# Patient Record
Sex: FEMALE | Race: WHITE | NOT HISPANIC OR LATINO | Employment: OTHER | URBAN - METROPOLITAN AREA
[De-identification: names, ages, dates, MRNs, and addresses within clinical notes are randomized per-mention and may not be internally consistent; named-entity substitution may affect disease eponyms.]

---

## 2020-12-21 ENCOUNTER — HOSPITAL ENCOUNTER (OUTPATIENT)
Facility: HOSPITAL | Age: 55
Setting detail: OBSERVATION
Discharge: HOME | End: 2020-12-23
Attending: INTERNAL MEDICINE | Admitting: HOSPITALIST

## 2020-12-21 DIAGNOSIS — R10.31 RIGHT GROIN PAIN: Primary | ICD-10-CM

## 2020-12-21 PROBLEM — E66.9 OBESITY (BMI 30-39.9): Chronic | Status: ACTIVE | Noted: 2020-12-21

## 2020-12-21 PROBLEM — I25.10 CAD (CORONARY ARTERY DISEASE): Chronic | Status: ACTIVE | Noted: 2020-12-21

## 2020-12-21 LAB
ALBUMIN SERPL-MCNC: 3.6 G/DL (ref 3.5–5.2)
ALBUMIN/GLOB SERPL: 1.4 G/DL
ALP SERPL-CCNC: 100 U/L (ref 39–117)
ALT SERPL W P-5'-P-CCNC: 17 U/L (ref 1–33)
ANION GAP SERPL CALCULATED.3IONS-SCNC: 6 MMOL/L (ref 5–15)
AST SERPL-CCNC: 13 U/L (ref 1–32)
BASOPHILS # BLD AUTO: 0.1 10*3/MM3 (ref 0–0.2)
BASOPHILS NFR BLD AUTO: 0.6 % (ref 0–1.5)
BILIRUB SERPL-MCNC: 0.2 MG/DL (ref 0–1.2)
BUN SERPL-MCNC: 16 MG/DL (ref 6–20)
BUN/CREAT SERPL: 19.5 (ref 7–25)
CALCIUM SPEC-SCNC: 8.8 MG/DL (ref 8.6–10.5)
CHLORIDE SERPL-SCNC: 106 MMOL/L (ref 98–107)
CO2 SERPL-SCNC: 29 MMOL/L (ref 22–29)
CREAT SERPL-MCNC: 0.82 MG/DL (ref 0.57–1)
DEPRECATED RDW RBC AUTO: 46.4 FL (ref 37–54)
EOSINOPHIL # BLD AUTO: 0.4 10*3/MM3 (ref 0–0.4)
EOSINOPHIL NFR BLD AUTO: 3.4 % (ref 0.3–6.2)
ERYTHROCYTE [DISTWIDTH] IN BLOOD BY AUTOMATED COUNT: 14.7 % (ref 12.3–15.4)
GFR SERPL CREATININE-BSD FRML MDRD: 72 ML/MIN/1.73
GLOBULIN UR ELPH-MCNC: 2.6 GM/DL
GLUCOSE SERPL-MCNC: 89 MG/DL (ref 65–99)
HCT VFR BLD AUTO: 35.1 % (ref 34–46.6)
HGB BLD-MCNC: 11.6 G/DL (ref 12–15.9)
HOLD SPECIMEN: NORMAL
INR PPP: <0.93 (ref 0.93–1.1)
LYMPHOCYTES # BLD AUTO: 3.1 10*3/MM3 (ref 0.7–3.1)
LYMPHOCYTES NFR BLD AUTO: 26.9 % (ref 19.6–45.3)
MCH RBC QN AUTO: 29.6 PG (ref 26.6–33)
MCHC RBC AUTO-ENTMCNC: 33.1 G/DL (ref 31.5–35.7)
MCV RBC AUTO: 89.5 FL (ref 79–97)
MONOCYTES # BLD AUTO: 1 10*3/MM3 (ref 0.1–0.9)
MONOCYTES NFR BLD AUTO: 8.5 % (ref 5–12)
NEUTROPHILS NFR BLD AUTO: 60.6 % (ref 42.7–76)
NEUTROPHILS NFR BLD AUTO: 7 10*3/MM3 (ref 1.7–7)
NRBC BLD AUTO-RTO: 0 /100 WBC (ref 0–0.2)
PLATELET # BLD AUTO: 301 10*3/MM3 (ref 140–450)
PMV BLD AUTO: 7.4 FL (ref 6–12)
POTASSIUM SERPL-SCNC: 3.6 MMOL/L (ref 3.5–5.2)
POTASSIUM SERPL-SCNC: 4.2 MMOL/L (ref 3.5–5.2)
PROT SERPL-MCNC: 6.2 G/DL (ref 6–8.5)
PROTHROMBIN TIME: 10 SECONDS (ref 9.6–11.7)
RBC # BLD AUTO: 3.92 10*6/MM3 (ref 3.77–5.28)
SODIUM SERPL-SCNC: 141 MMOL/L (ref 136–145)
WBC # BLD AUTO: 11.5 10*3/MM3 (ref 3.4–10.8)

## 2020-12-21 PROCEDURE — G0378 HOSPITAL OBSERVATION PER HR: HCPCS

## 2020-12-21 PROCEDURE — 85025 COMPLETE CBC W/AUTO DIFF WBC: CPT | Performed by: NURSE PRACTITIONER

## 2020-12-21 PROCEDURE — 84132 ASSAY OF SERUM POTASSIUM: CPT | Performed by: PHYSICIAN ASSISTANT

## 2020-12-21 PROCEDURE — 85610 PROTHROMBIN TIME: CPT | Performed by: NURSE PRACTITIONER

## 2020-12-21 PROCEDURE — 99219 PR INITIAL OBSERVATION CARE/DAY 50 MINUTES: CPT | Performed by: PHYSICIAN ASSISTANT

## 2020-12-21 PROCEDURE — 80053 COMPREHEN METABOLIC PANEL: CPT | Performed by: NURSE PRACTITIONER

## 2020-12-21 PROCEDURE — 99284 EMERGENCY DEPT VISIT MOD MDM: CPT

## 2020-12-21 RX ORDER — SODIUM CHLORIDE 0.9 % (FLUSH) 0.9 %
10 SYRINGE (ML) INJECTION AS NEEDED
Status: DISCONTINUED | OUTPATIENT
Start: 2020-12-21 | End: 2020-12-23 | Stop reason: HOSPADM

## 2020-12-21 RX ORDER — ONDANSETRON 4 MG/1
4 TABLET, FILM COATED ORAL EVERY 6 HOURS PRN
Status: DISCONTINUED | OUTPATIENT
Start: 2020-12-21 | End: 2020-12-23 | Stop reason: HOSPADM

## 2020-12-21 RX ORDER — CHOLECALCIFEROL (VITAMIN D3) 125 MCG
5 CAPSULE ORAL NIGHTLY PRN
Status: DISCONTINUED | OUTPATIENT
Start: 2020-12-21 | End: 2020-12-23 | Stop reason: HOSPADM

## 2020-12-21 RX ORDER — ONDANSETRON 2 MG/ML
4 INJECTION INTRAMUSCULAR; INTRAVENOUS EVERY 6 HOURS PRN
Status: DISCONTINUED | OUTPATIENT
Start: 2020-12-21 | End: 2020-12-23 | Stop reason: HOSPADM

## 2020-12-21 RX ORDER — CLOPIDOGREL BISULFATE 75 MG/1
75 TABLET ORAL DAILY
COMMUNITY
Start: 2020-12-18

## 2020-12-21 RX ORDER — CLOPIDOGREL BISULFATE 75 MG/1
75 TABLET ORAL DAILY
Status: DISCONTINUED | OUTPATIENT
Start: 2020-12-22 | End: 2020-12-23 | Stop reason: HOSPADM

## 2020-12-21 RX ORDER — POTASSIUM CHLORIDE 1.5 G/1.77G
40 POWDER, FOR SOLUTION ORAL AS NEEDED
Status: DISCONTINUED | OUTPATIENT
Start: 2020-12-21 | End: 2020-12-23 | Stop reason: HOSPADM

## 2020-12-21 RX ORDER — CALCIUM GLUCONATE 20 MG/ML
2 INJECTION, SOLUTION INTRAVENOUS AS NEEDED
Status: DISCONTINUED | OUTPATIENT
Start: 2020-12-21 | End: 2020-12-23 | Stop reason: HOSPADM

## 2020-12-21 RX ORDER — METOPROLOL SUCCINATE 25 MG/1
25 TABLET, EXTENDED RELEASE ORAL
Status: DISCONTINUED | OUTPATIENT
Start: 2020-12-22 | End: 2020-12-23 | Stop reason: HOSPADM

## 2020-12-21 RX ORDER — MAGNESIUM SULFATE HEPTAHYDRATE 40 MG/ML
4 INJECTION, SOLUTION INTRAVENOUS AS NEEDED
Status: DISCONTINUED | OUTPATIENT
Start: 2020-12-21 | End: 2020-12-23 | Stop reason: HOSPADM

## 2020-12-21 RX ORDER — ACETAMINOPHEN 325 MG/1
650 TABLET ORAL EVERY 4 HOURS PRN
Status: DISCONTINUED | OUTPATIENT
Start: 2020-12-21 | End: 2020-12-23 | Stop reason: HOSPADM

## 2020-12-21 RX ORDER — POTASSIUM CHLORIDE 20 MEQ/1
40 TABLET, EXTENDED RELEASE ORAL AS NEEDED
Status: DISCONTINUED | OUTPATIENT
Start: 2020-12-21 | End: 2020-12-23 | Stop reason: HOSPADM

## 2020-12-21 RX ORDER — LISINOPRIL 5 MG/1
5 TABLET ORAL DAILY
COMMUNITY
Start: 2020-12-18

## 2020-12-21 RX ORDER — ACETAMINOPHEN 650 MG/1
650 SUPPOSITORY RECTAL EVERY 4 HOURS PRN
Status: DISCONTINUED | OUTPATIENT
Start: 2020-12-21 | End: 2020-12-23 | Stop reason: HOSPADM

## 2020-12-21 RX ORDER — LISINOPRIL 5 MG/1
5 TABLET ORAL DAILY
Status: DISCONTINUED | OUTPATIENT
Start: 2020-12-22 | End: 2020-12-23 | Stop reason: HOSPADM

## 2020-12-21 RX ORDER — SODIUM CHLORIDE 0.9 % (FLUSH) 0.9 %
10 SYRINGE (ML) INJECTION EVERY 12 HOURS SCHEDULED
Status: DISCONTINUED | OUTPATIENT
Start: 2020-12-21 | End: 2020-12-23 | Stop reason: HOSPADM

## 2020-12-21 RX ORDER — ACETAMINOPHEN 160 MG/5ML
650 SOLUTION ORAL EVERY 4 HOURS PRN
Status: DISCONTINUED | OUTPATIENT
Start: 2020-12-21 | End: 2020-12-23 | Stop reason: HOSPADM

## 2020-12-21 RX ORDER — ATORVASTATIN CALCIUM 40 MG/1
80 TABLET, FILM COATED ORAL NIGHTLY
Status: DISCONTINUED | OUTPATIENT
Start: 2020-12-21 | End: 2020-12-23 | Stop reason: HOSPADM

## 2020-12-21 RX ORDER — MAGNESIUM SULFATE HEPTAHYDRATE 40 MG/ML
2 INJECTION, SOLUTION INTRAVENOUS AS NEEDED
Status: DISCONTINUED | OUTPATIENT
Start: 2020-12-21 | End: 2020-12-23 | Stop reason: HOSPADM

## 2020-12-21 RX ORDER — ATORVASTATIN CALCIUM 80 MG/1
80 TABLET, FILM COATED ORAL NIGHTLY
COMMUNITY
Start: 2020-12-18

## 2020-12-21 RX ORDER — ASPIRIN 81 MG/1
81 TABLET, CHEWABLE ORAL DAILY
COMMUNITY
Start: 2020-12-18

## 2020-12-21 RX ORDER — NITROGLYCERIN 0.4 MG/1
0.4 TABLET SUBLINGUAL
Status: DISCONTINUED | OUTPATIENT
Start: 2020-12-21 | End: 2020-12-23 | Stop reason: HOSPADM

## 2020-12-21 RX ORDER — CALCIUM GLUCONATE 20 MG/ML
1 INJECTION, SOLUTION INTRAVENOUS AS NEEDED
Status: DISCONTINUED | OUTPATIENT
Start: 2020-12-21 | End: 2020-12-23 | Stop reason: HOSPADM

## 2020-12-21 RX ORDER — ASPIRIN 81 MG/1
81 TABLET, CHEWABLE ORAL DAILY
Status: DISCONTINUED | OUTPATIENT
Start: 2020-12-22 | End: 2020-12-23 | Stop reason: HOSPADM

## 2020-12-21 RX ADMIN — Medication 10 ML: at 23:03

## 2020-12-22 ENCOUNTER — APPOINTMENT (OUTPATIENT)
Dept: CARDIOLOGY | Facility: HOSPITAL | Age: 55
End: 2020-12-22

## 2020-12-22 ENCOUNTER — APPOINTMENT (OUTPATIENT)
Dept: CT IMAGING | Facility: HOSPITAL | Age: 55
End: 2020-12-22

## 2020-12-22 PROBLEM — I10 ESSENTIAL HYPERTENSION: Status: ACTIVE | Noted: 2020-12-22

## 2020-12-22 PROBLEM — Z72.0 TOBACCO ABUSE: Status: ACTIVE | Noted: 2020-12-22

## 2020-12-22 LAB
ANION GAP SERPL CALCULATED.3IONS-SCNC: 5 MMOL/L (ref 5–15)
BASOPHILS # BLD AUTO: 0.1 10*3/MM3 (ref 0–0.2)
BASOPHILS NFR BLD AUTO: 1 % (ref 0–1.5)
BH CV RIGHT GROIN PSA PROCEDURE SCRIPTING LRR: 1
BH CV VAS R PFA VELOCITY EDV: 7.86 CM/SEC
BH CV VAS R SFA VELOCITY EDV: 14 CM/SEC
BUN SERPL-MCNC: 19 MG/DL (ref 6–20)
BUN/CREAT SERPL: 19 (ref 7–25)
CALCIUM SPEC-SCNC: 8.7 MG/DL (ref 8.6–10.5)
CHLORIDE SERPL-SCNC: 107 MMOL/L (ref 98–107)
CO2 SERPL-SCNC: 30 MMOL/L (ref 22–29)
CREAT SERPL-MCNC: 1 MG/DL (ref 0.57–1)
DEPRECATED RDW RBC AUTO: 45.1 FL (ref 37–54)
EOSINOPHIL # BLD AUTO: 0.4 10*3/MM3 (ref 0–0.4)
EOSINOPHIL NFR BLD AUTO: 3.3 % (ref 0.3–6.2)
ERYTHROCYTE [DISTWIDTH] IN BLOOD BY AUTOMATED COUNT: 14.3 % (ref 12.3–15.4)
GFR SERPL CREATININE-BSD FRML MDRD: 57 ML/MIN/1.73
GLUCOSE BLDC GLUCOMTR-MCNC: 109 MG/DL (ref 70–105)
GLUCOSE SERPL-MCNC: 97 MG/DL (ref 65–99)
HCT VFR BLD AUTO: 32.5 % (ref 34–46.6)
HGB BLD-MCNC: 10.8 G/DL (ref 12–15.9)
LYMPHOCYTES # BLD AUTO: 3.7 10*3/MM3 (ref 0.7–3.1)
LYMPHOCYTES NFR BLD AUTO: 34.7 % (ref 19.6–45.3)
MAGNESIUM SERPL-MCNC: 2.1 MG/DL (ref 1.6–2.6)
MCH RBC QN AUTO: 29.8 PG (ref 26.6–33)
MCHC RBC AUTO-ENTMCNC: 33.1 G/DL (ref 31.5–35.7)
MCV RBC AUTO: 90 FL (ref 79–97)
MONOCYTES # BLD AUTO: 0.9 10*3/MM3 (ref 0.1–0.9)
MONOCYTES NFR BLD AUTO: 8.3 % (ref 5–12)
NEUTROPHILS NFR BLD AUTO: 5.5 10*3/MM3 (ref 1.7–7)
NEUTROPHILS NFR BLD AUTO: 52.7 % (ref 42.7–76)
NRBC BLD AUTO-RTO: 0.1 /100 WBC (ref 0–0.2)
PLATELET # BLD AUTO: 274 10*3/MM3 (ref 140–450)
PMV BLD AUTO: 7.3 FL (ref 6–12)
POTASSIUM SERPL-SCNC: 4.4 MMOL/L (ref 3.5–5.2)
PROX PFA PSV RIGHT: 39.8 CM/SEC
PROX SFA PSV RIGHT: 113 CM/SEC
RBC # BLD AUTO: 3.61 10*6/MM3 (ref 3.77–5.28)
RIGHT GROIN CFA SYS: 123 CM/SEC
SODIUM SERPL-SCNC: 142 MMOL/L (ref 136–145)
WBC # BLD AUTO: 10.5 10*3/MM3 (ref 3.4–10.8)

## 2020-12-22 PROCEDURE — 83735 ASSAY OF MAGNESIUM: CPT | Performed by: PHYSICIAN ASSISTANT

## 2020-12-22 PROCEDURE — 85025 COMPLETE CBC W/AUTO DIFF WBC: CPT | Performed by: PHYSICIAN ASSISTANT

## 2020-12-22 PROCEDURE — 80048 BASIC METABOLIC PNL TOTAL CA: CPT | Performed by: PHYSICIAN ASSISTANT

## 2020-12-22 PROCEDURE — 93926 LOWER EXTREMITY STUDY: CPT

## 2020-12-22 PROCEDURE — 0 IOPAMIDOL PER 1 ML: Performed by: HOSPITALIST

## 2020-12-22 PROCEDURE — G0378 HOSPITAL OBSERVATION PER HR: HCPCS

## 2020-12-22 PROCEDURE — 74177 CT ABD & PELVIS W/CONTRAST: CPT

## 2020-12-22 PROCEDURE — 99225 PR SBSQ OBSERVATION CARE/DAY 25 MINUTES: CPT | Performed by: HOSPITALIST

## 2020-12-22 PROCEDURE — 82962 GLUCOSE BLOOD TEST: CPT

## 2020-12-22 RX ORDER — HYDROCODONE BITARTRATE AND ACETAMINOPHEN 5; 325 MG/1; MG/1
1 TABLET ORAL ONCE
Status: COMPLETED | OUTPATIENT
Start: 2020-12-22 | End: 2020-12-22

## 2020-12-22 RX ORDER — HYDROCODONE BITARTRATE AND ACETAMINOPHEN 5; 325 MG/1; MG/1
1 TABLET ORAL EVERY 6 HOURS PRN
Status: DISCONTINUED | OUTPATIENT
Start: 2020-12-22 | End: 2020-12-22

## 2020-12-22 RX ORDER — NICOTINE 21 MG/24HR
1 PATCH, TRANSDERMAL 24 HOURS TRANSDERMAL EVERY 24 HOURS
Status: DISCONTINUED | OUTPATIENT
Start: 2020-12-22 | End: 2020-12-23 | Stop reason: HOSPADM

## 2020-12-22 RX ADMIN — ASPIRIN 81 MG CHEWABLE TABLET 81 MG: 81 TABLET CHEWABLE at 09:08

## 2020-12-22 RX ADMIN — CLOPIDOGREL BISULFATE 75 MG: 75 TABLET ORAL at 09:09

## 2020-12-22 RX ADMIN — IOPAMIDOL 100 ML: 755 INJECTION, SOLUTION INTRAVENOUS at 13:00

## 2020-12-22 RX ADMIN — Medication 1 PATCH: at 07:13

## 2020-12-22 RX ADMIN — Medication 10 ML: at 21:56

## 2020-12-22 RX ADMIN — ATORVASTATIN CALCIUM 80 MG: 40 TABLET, FILM COATED ORAL at 21:56

## 2020-12-22 RX ADMIN — Medication 10 ML: at 09:10

## 2020-12-22 RX ADMIN — HYDROCODONE BITARTRATE AND ACETAMINOPHEN 1 TABLET: 5; 325 TABLET ORAL at 00:41

## 2020-12-22 RX ADMIN — ATORVASTATIN CALCIUM 80 MG: 40 TABLET, FILM COATED ORAL at 00:41

## 2020-12-22 NOTE — H&P
[facility] Medicine Services      Patient Name: Kimberly West  : 1965  MRN: [id number]  Primary Care Physician: Provider, No Known  Date of admission: 2020    Patient Care Team:  Provider, No Known as PCP - General          Subjective   History Present Illness     Chief Complaint:   Chief Complaint   Patient presents with   • Leg Pain         Ms. West is a 55 y.o. female with past medical history of tobacco abuse, obesity, hypertension CAD status post recent stenting who presents to [facility] complaining of pain in right groin.  Patient reports that soon after cardiac cath on 2020 [facility] she noted significant and expanding bruising on the medial portion of her right thigh and groin with a small nodule noted as slowly increasing in size.  Patient reports pain in the area described as a sharp squeezing pain rated at 10/10 made worse if she is immobile for long periods of time better with movement.  Pain is noted as radiating down her right leg.  Occasional palpitations are also reported she notes a mild chronic nonproductive cough but denies any dyspnea, nausea or vomiting, changes in bowel or bladder habits, fever, chills, peripheral edema, syncope or near syncope.  Patient does report that she continues to smoke proximately 1/2 pack cigarettes per day    In the ED patient did have lab significant for WBCs: 11.50 with increased absolute monocyte count noted on differential, hemoglobin: 11.6, INR: 0.93.    History of Present Illness    Review of Systems   Constitution: Negative.   HENT: Negative.    Eyes: Negative.    Cardiovascular: Positive for palpitations. Negative for chest pain, irregular heartbeat, leg swelling, near-syncope and syncope.   Respiratory: Positive for cough. Negative for shortness of breath and sputum production.         Chronic cough at baseline   Endocrine: Negative.    Skin: Negative.    Musculoskeletal:        Right leg  pain   Gastrointestinal: Negative.    Neurological: Negative.    Psychiatric/Behavioral: Negative.            Personal History     Past Medical History: History reviewed. No pertinent past medical history.    Surgical History:    History reviewed. No pertinent surgical history.        Family History: family history is not on file. Otherwise pertinent FHx was reviewed and unremarkable.     Social History:        Medications:  Prior to Admission medications    Not on File       Allergies:  No Known Allergies    Objective   Objective     Vital Signs  Temp:  [98.5 °F (36.9 °C)] 98.5 °F (36.9 °C)  Heart Rate:  [] 97  Resp:  [18] 18  BP: ()/() 90/62  SpO2:  [97 %-99 %] 99 %  on   ;   Device (Oxygen Therapy): room air  Body mass index is 30.23 kg/m².    Physical Exam  Vitals signs reviewed. Exam conducted with a chaperone present.   Constitutional:       General: She is not in acute distress.     Appearance: Normal appearance. She is obese. She is not ill-appearing or toxic-appearing.   HENT:      Head: Normocephalic and atraumatic.      Right Ear: External ear normal.      Left Ear: External ear normal.      Nose: Nose normal.      Mouth/Throat:      Mouth: Mucous membranes are moist.   Eyes:      Extraocular Movements: Extraocular movements intact.      Conjunctiva/sclera: Conjunctivae normal.   Neck:      Musculoskeletal: Normal range of motion.   Cardiovascular:      Rate and Rhythm: Normal rate and regular rhythm.      Pulses: Normal pulses.      Heart sounds: Normal heart sounds.   Pulmonary:      Effort: Pulmonary effort is normal.      Breath sounds: Normal breath sounds.   Abdominal:      General: Bowel sounds are normal. There is no distension.      Tenderness: There is no abdominal tenderness.   Musculoskeletal: Normal range of motion.   Skin:     General: Skin is warm and dry.      Findings: Bruising present.      Comments: Bruising along right thigh and groin with small nodule noted in right  groin   Neurological:      General: No focal deficit present.      Mental Status: She is alert and oriented to person, place, and time.   Psychiatric:         Mood and Affect: Mood normal.         Behavior: Behavior normal.         Thought Content: Thought content normal.         Judgment: Judgment normal.         Results Review:  I have personally reviewed most recent lab results and agree with findings, most notably: CBC, CMP, PT/INR.    Results from last 7 days   Lab Units 12/21/20 2019   WBC 10*3/mm3 11.50*   HEMOGLOBIN g/dL 11.6*   HEMATOCRIT % 35.1   PLATELETS 10*3/mm3 301   INR  <0.93*     Results from last 7 days   Lab Units 12/21/20 2019   SODIUM mmol/L 141   POTASSIUM mmol/L 4.2   CHLORIDE mmol/L 106   CO2 mmol/L 29.0   BUN mg/dL 16   CREATININE mg/dL 0.82   GLUCOSE mg/dL 89   CALCIUM mg/dL 8.8   ALT (SGPT) U/L 17   AST (SGOT) U/L 13     Estimated Creatinine Clearance: 68.2 mL/min (by C-G formula based on SCr of 0.82 mg/dL).  Brief Urine Lab Results     None          Microbiology Results (last 10 days)     ** No results found for the last 240 hours. **          ECG/EMG Results (most recent)     None                    No radiology results for the last 7 days      Estimated Creatinine Clearance: 68.2 mL/min (by C-G formula based on SCr of 0.82 mg/dL).    Assessment/Plan   Assessment/Plan       Active Hospital Problems    Diagnosis  POA   • **Right groin pain [R10.31]  Yes     Priority: High   • CAD (coronary artery disease) [I25.10]  Yes     Priority: Medium   • Obesity (BMI 30-39.9) [E66.9]  Unknown     Priority: Low      Resolved Hospital Problems   No resolved problems to display.     Right groin pain  -Patient reports worsening bruising along right thigh and groin with a small nodule which is increasing in size since cardiac catheterization on 12/17/2020 at [facility].  Nodule approximately 1/2 inch in diameter with slight pulsation noted  -Maintain bedrest  -Ultrasound  ordered  -Norco ordered x1 for pain  -Vascular surgery consulted    CAD  -Aspirin, statin, Plavix and beta-blocker  -Continue cardiac monitoring    Hypertension  -Well controlled with a blood pressure on admission of 114/74  - Continue metoprolol and lisinopril  - Monitor while admitted    Bacot abuse  -Patient reports she continues to smoke proximately 1/2 pack cigarettes per day  -Encourage cessation especially in light of patient's comorbid conditions  -Nicotine patch ordered    Obesity (BMI: 30.23)  -Encourage diet lifestyle modifications            VTE Prophylaxis -SCDs  Mechanical Order History:     None      Pharmalogical Order History:     None          CODE STATUS: Full  There are no questions and answers to display.           I discussed the patient's findings and my recommendations with patient and nursing staff.      Signature:Electronically signed by Raj Parker PA-C, 12/22/20, 6:08 AM EST.    [facility] Hospitalist Team

## 2020-12-22 NOTE — PROGRESS NOTES
[facility] Medicine Services Daily Progress Note      Hospitalist Team  LOS 0 days      Patient Care Team:  Provider, No Known as PCP - General    Patient Location: [facility]/1      Subjective   Subjective     Chief Complaint / Subjective  Chief Complaint   Patient presents with   • Leg Pain         Brief Synopsis of Hospital Course/HPI    Ms. West is a 55 y.o. female with past medical history of tobacco abuse, obesity, hypertension CAD status post recent stenting who presents to [facility] complaining of pain in right groin.  Patient reports that soon after cardiac cath on 12/17/2020 [facility] she noted significant and expanding bruising on the medial portion of her right thigh and groin with a small nodule noted as slowly increasing in size.  Patient reports pain in the area described as a sharp squeezing pain rated at 10/10 made worse if she is immobile for long periods of time better with movement.  Pain is noted as radiating down her right leg.  Occasional palpitations are also reported she notes a mild chronic nonproductive cough but denies any dyspnea, nausea or vomiting, changes in bowel or bladder habits, fever, chills, peripheral edema, syncope or near syncope.  Patient does report that she continues to smoke proximately 1/2 pack cigarettes per day     In the ED patient did have lab significant for WBCs: 11.50 with increased absolute monocyte count noted on differential, hemoglobin: 11.6, INR: 0.93.      Date::      12/22/20: afebrile.  Complains of groin pain.  Ordered CT of the abdomen/pelvis.  Vascular surgery consulted.    Review of Systems   All other systems reviewed and are negative.        Objective   Objective      Vital Signs  Temp:  [97.6 °F (36.4 °C)-98.5 °F (36.9 °C)] 97.6 °F (36.4 °C)  Heart Rate:  [] 95  Resp:  [16-19] 19  BP: ()/() 115/74  Oxygen Therapy  SpO2: 98 %  Pulse Oximetry Type: Intermittent  Device (Oxygen Therapy): room  "air  Flowsheet Rows      First Filed Value   Admission Height  149.9 cm (59\") Documented at 12/21/2020 1944   Admission Weight  67.9 kg (149 lb 11.1 oz) Documented at 12/21/2020 1944        Intake & Output (last 3 days)       12/19 0701 - 12/20 0700 12/20 0701 - 12/21 0700 12/21 0701 - 12/22 0700 12/22 0701 - 12/23 0700    P.O.    236    Total Intake(mL/kg)    236 (3.5)    Net    +236                Lines, Drains & Airways    Active LDAs     Name:   Placement date:   Placement time:   Site:   Days:    Peripheral IV 12/21/20 2017 Left Antecubital   12/21/20 2017    Antecubital   less than 1                  Physical Exam:    Physical Exam  HENT:      Head: Normocephalic and atraumatic.      Mouth/Throat:      Mouth: Mucous membranes are moist.   Eyes:      General: No scleral icterus.     Extraocular Movements: Extraocular movements intact.      Pupils: Pupils are equal, round, and reactive to light.   Neck:      Musculoskeletal: Normal range of motion.   Cardiovascular:      Rate and Rhythm: Normal rate and regular rhythm.   Pulmonary:      Effort: Pulmonary effort is normal.      Breath sounds: Normal breath sounds.   Abdominal:      General: Bowel sounds are normal.      Palpations: Abdomen is soft.   Musculoskeletal: Normal range of motion.   Lymphadenopathy:      Cervical: No cervical adenopathy.   Skin:     General: Skin is warm.   Neurological:      Mental Status: She is alert and oriented to person, place, and time.   Psychiatric:         Mood and Affect: Mood is anxious.               Procedures:              Results Review:     I reviewed the patient's new clinical results.      Lab Results (last 24 hours)     Procedure Component Value Units Date/Time    POC Glucose Once [[id number]]  (Abnormal) Collected: 12/22/20 1027    Specimen: Blood Updated: 12/22/20 1028     Glucose 109 mg/dL      Comment: Serial Number: [id number]:  [id number]       Basic Metabolic Panel [[id number]]  (Abnormal) Collected: " 12/22/20 0257    Specimen: Blood Updated: 12/22/20 0402     Glucose 97 mg/dL      BUN 19 mg/dL      Creatinine 1.00 mg/dL      Sodium 142 mmol/L      Potassium 4.4 mmol/L      Chloride 107 mmol/L      CO2 30.0 mmol/L      Calcium 8.7 mg/dL      eGFR Non African Amer 57 mL/min/1.73      BUN/Creatinine Ratio 19.0     Anion Gap 5.0 mmol/L     Narrative:      GFR Normal >60  Chronic Kidney Disease <60  Kidney Failure <15      Magnesium [[id number]]  (Normal) Collected: 12/22/20 0257    Specimen: Blood Updated: 12/22/20 0402     Magnesium 2.1 mg/dL     CBC & Differential [[id number]]  (Abnormal) Collected: 12/22/20 0257    Specimen: Blood Updated: 12/22/20 0318    Narrative:      The following orders were created for panel order CBC & Differential.  Procedure                               Abnormality         Status                     ---------                               -----------         ------                     CBC Auto Differential[[id number]]        Abnormal            Final result                 Please view results for these tests on the individual orders.    CBC Auto Differential [[id number]]  (Abnormal) Collected: 12/22/20 0257    Specimen: Blood Updated: 12/22/20 0318     WBC 10.50 10*3/mm3      RBC 3.61 10*6/mm3      Hemoglobin 10.8 g/dL      Hematocrit 32.5 %      MCV 90.0 fL      MCH 29.8 pg      MCHC 33.1 g/dL      RDW 14.3 %      RDW-SD 45.1 fl      MPV 7.3 fL      Platelets 274 10*3/mm3      Neutrophil % 52.7 %      Lymphocyte % 34.7 %      Monocyte % 8.3 %      Eosinophil % 3.3 %      Basophil % 1.0 %      Neutrophils, Absolute 5.50 10*3/mm3      Lymphocytes, Absolute 3.70 10*3/mm3      Monocytes, Absolute 0.90 10*3/mm3      Eosinophils, Absolute 0.40 10*3/mm3      Basophils, Absolute 0.10 10*3/mm3      nRBC 0.1 /100 WBC     Potassium [[id number]]  (Normal) Collected: 12/21/20 2239    Specimen: Blood Updated: 12/21/20 2315     Potassium 3.6 mmol/L     [place] Draw [[id number]] Collected: 12/21/20 2018     Specimen: Blood Updated: 12/21/20 2132    Narrative:      The following orders were created for panel order [place] Draw.  Procedure                               Abnormality         Status                     ---------                               -----------         ------                     Light Blue Top[[id number]]                                                              Green Top (Gel)[[id number]]                                                             Lavender Top[[id number]]                                                                Gold Top - SST[[id number]]                                   Final result                 Please view results for these tests on the individual orders.    Gold Top - SST [[id number]] Collected: 12/21/20 2018    Specimen: Blood Updated: 12/21/20 2132     Extra Tube Hold for add-ons.     Comment: Auto resulted.       Protime-INR [[id number]]  (Abnormal) Collected: 12/21/20 2019    Specimen: Blood Updated: 12/21/20 2109     Protime 10.0 Seconds      INR <0.93    Comprehensive Metabolic Panel [[id number]] Collected: 12/21/20 2019    Specimen: Blood Updated: 12/21/20 2045     Glucose 89 mg/dL      BUN 16 mg/dL      Creatinine 0.82 mg/dL      Sodium 141 mmol/L      Potassium 4.2 mmol/L      Chloride 106 mmol/L      CO2 29.0 mmol/L      Calcium 8.8 mg/dL      Total Protein 6.2 g/dL      Albumin 3.60 g/dL      ALT (SGPT) 17 U/L      AST (SGOT) 13 U/L      Alkaline Phosphatase 100 U/L      Total Bilirubin 0.2 mg/dL      eGFR Non African Amer 72 mL/min/1.73      Globulin 2.6 gm/dL      A/G Ratio 1.4 g/dL      BUN/Creatinine Ratio 19.5     Anion Gap 6.0 mmol/L     Narrative:      GFR Normal >60  Chronic Kidney Disease <60  Kidney Failure <15      CBC & Differential [[id number]]  (Abnormal) Collected: 12/21/20 2019    Specimen: Blood Updated: 12/21/20 2025    Narrative:      The following orders were created for panel order CBC & Differential.  Procedure                                Abnormality         Status                     ---------                               -----------         ------                     CBC Auto Differential[[id number]]        Abnormal            Final result                 Please view results for these tests on the individual orders.    CBC Auto Differential [[id number]]  (Abnormal) Collected: 12/21/20 2019    Specimen: Blood Updated: 12/21/20 2025     WBC 11.50 10*3/mm3      RBC 3.92 10*6/mm3      Hemoglobin 11.6 g/dL      Hematocrit 35.1 %      MCV 89.5 fL      MCH 29.6 pg      MCHC 33.1 g/dL      RDW 14.7 %      RDW-SD 46.4 fl      MPV 7.4 fL      Platelets 301 10*3/mm3      Neutrophil % 60.6 %      Lymphocyte % 26.9 %      Monocyte % 8.5 %      Eosinophil % 3.4 %      Basophil % 0.6 %      Neutrophils, Absolute 7.00 10*3/mm3      Lymphocytes, Absolute 3.10 10*3/mm3      Monocytes, Absolute 1.00 10*3/mm3      Eosinophils, Absolute 0.40 10*3/mm3      Basophils, Absolute 0.10 10*3/mm3      nRBC 0.0 /100 WBC         No results found for: HGBA1C  Results from last 7 days   Lab Units 12/21/20 2019   INR  <0.93*           No results found for: LIPASE  No results found for: CHOL, CHLPL, TRIG, HDL, LDL, LDLDIRECT    No results found for: INTRAOP, PREDX, FINALDX, COMDX    Microbiology Results (last 10 days)     ** No results found for the last 240 hours. **          ECG/EMG Results (most recent)     None          Results for orders placed during the hospital encounter of 12/21/20   Duplex Groin Pseudoaneurysm unilateral CAR    Narrative · No evidence of pseudoaneurysm or AV fistula in the right groin.               No radiology results for the last 7 days        Xrays, labs reviewed personally by physician.    Medication Review:   I have reviewed the patient's current medication list      Scheduled Meds  aspirin, 81 mg, Oral, Daily  atorvastatin, 80 mg, Oral, Nightly  clopidogrel, 75 mg, Oral, Daily  lisinopril, 5 mg, Oral, Daily  metoprolol succinate XL, 25 mg, Oral,  Q24H  nicotine, 1 patch, Transdermal, Q24H  sodium chloride, 10 mL, Intravenous, Q12H        Meds Infusions       Meds PRN  •  acetaminophen **OR** acetaminophen **OR** acetaminophen  •  Calcium Gluconate-NaCl **AND** calcium gluconate **AND** Calcium, Ionized  •  magnesium sulfate **OR** magnesium sulfate **OR** magnesium sulfate  •  melatonin  •  nitroglycerin  •  ondansetron **OR** ondansetron  •  potassium & sodium phosphates **OR** potassium & sodium phosphates  •  potassium chloride  •  potassium chloride  •  [COMPLETED] Insert peripheral IV **AND** sodium chloride  •  sodium chloride        Assessment/Plan   Assessment/Plan     Active Hospital Problems    Diagnosis  POA   • **Right groin pain [R10.31]  Yes   • Essential hypertension [I10]  Yes   • Tobacco abuse [Z72.0]  Yes   • CAD (coronary artery disease) [I25.10]  Yes   • Obesity (BMI 30-39.9) [E66.9]  Yes      Resolved Hospital Problems   No resolved problems to display.       MEDICAL DECISION MAKING COMPLEXITY BY PROBLEM:     Right groin pain:  -s/p LHC on 12/17/20 at [facility].  Nodule approximately 1/2 inch in diameter with slight pulsation noted  -right femoral duplex ordered  -Vascular surgery consulted  -CT abd/pelvis ordered       CAD:  -Denies chest pain  -Aspirin, statin, Plavix and beta-blocker     Hypertension  - Continue metoprolol and lisinopril  - Monitor while admitted     Tobacco abuse  -Nicotine patch ordered             VTE Prophylaxis -   Mechanical Order History:      Ordered        12/21/20 2209  Place Sequential Compression Device  Once         12/21/20 2209  Maintain Sequential Compression Device  Continuous                 Pharmalogical Order History:     None            Code Status -   Code Status and Medical Interventions:   Ordered at: 12/21/20 2136     Code Status:    CPR     Medical Interventions (Level of Support Prior to Arrest):    Full       This patient has been examined wearing appropriate  Personal Protective Equipment and discussed with nursing. 12/22/20        Discharge Planning  defer        Electronically signed by Manolo Melgoza DO, 12/22/20, 11:42 EST.  [facility]ist [facility]

## 2020-12-22 NOTE — ED PROVIDER NOTES
Subjective   Patient is a 55-year-old female who is a poor historian who has a history of COPD and hypertension -states she had a heart attack last week at the [facility] and had a heart catheterization and stent placement.  She states she went back today and had a loop recorder placed which she is to wear on her left chest for the next 14 days.  She states that she has a large amount of bruising and pain to the right groin after heart catheterization.  She rates her pain a 7/10.  She states it radiates down her leg and into her abdomen but is concentrated in the right groin she reports extensive bruising to the right groin and right leg.  Today she denies chest pain shortness of breath fever chills nausea or vomiting          Review of Systems   Constitutional: Negative for chills, fatigue and fever.   HENT: Negative for congestion, tinnitus and trouble swallowing.    Eyes: Negative for photophobia, discharge and redness.   Respiratory: Negative for cough and shortness of breath.    Cardiovascular: Negative for chest pain and palpitations.   Gastrointestinal: Negative for abdominal pain, diarrhea, nausea and vomiting.   Genitourinary: Negative for dysuria, frequency and urgency.   Musculoskeletal: Negative for back pain, joint swelling and myalgias.        Right groin knot that is painful   Skin: Negative for rash.        Large bruising to the right groin and right leg   Neurological: Negative for dizziness and headaches.   Psychiatric/Behavioral: Negative for confusion.   All other systems reviewed and are negative.      History reviewed. No pertinent past medical history.    No Known Allergies    History reviewed. No pertinent surgical history.    No family history on file.    Social History     Socioeconomic History   • Marital status:      Spouse name: Not on file   • Number of children: Not on file   • Years of education: Not on file   • Highest education level: Not on file            Objective   Physical Exam  Vitals signs reviewed.   Constitutional:       Appearance: She is well-developed.   HENT:      Head: Normocephalic and atraumatic.      Right Ear: Tympanic membrane normal.      Left Ear: Tympanic membrane normal.      Nose: Nose normal.      Mouth/Throat:      Mouth: Mucous membranes are moist.      Pharynx: Oropharynx is clear.   Eyes:      Conjunctiva/sclera: Conjunctivae normal.      Pupils: Pupils are equal, round, and reactive to light.   Neck:      Musculoskeletal: Normal range of motion and neck supple.   Cardiovascular:      Rate and Rhythm: Normal rate and regular rhythm.      Pulses: Normal pulses.      Heart sounds: Normal heart sounds.   Pulmonary:      Effort: Pulmonary effort is normal. No respiratory distress.      Breath sounds: Normal breath sounds. No wheezing.   Abdominal:      General: Bowel sounds are normal. There is no distension.      Palpations: Abdomen is soft. There is no mass.      Tenderness: There is no abdominal tenderness. There is no guarding or rebound.   Musculoskeletal: Normal range of motion.         General: No deformity.      Right hip: She exhibits tenderness.        Legs:    Skin:     General: Skin is warm and dry.      Capillary Refill: Capillary refill takes less than 2 seconds.   Neurological:      General: No focal deficit present.      Mental Status: She is alert and oriented to person, place, and time.      GCS: GCS eye subscore is 4. GCS verbal subscore is 5. GCS motor subscore is 6.      Cranial Nerves: No cranial nerve deficit.      Sensory: No sensory deficit.      Motor: Motor function is intact.      Coordination: Coordination is intact.      Deep Tendon Reflexes: Reflexes normal.         Procedures           ED Course                                           MDM  Number of Diagnoses or Management Options  Right groin pain:   Diagnosis management comments: Patient had above exam and the patient was discussed extensively with  Dr. Gregorio Brown my attending this evening and due to the inability to perform Doppler to rule out pseudoaneurysm the patient will be kept overnight blood work was within normal limits patient remained stable patient will be placed in observation and Doppler will be performed in the morning-patient was made aware of this plan and agreeable    Records from [facility] were obtained-and reviewed and it appears that the patient had a troponin of 17.84 and was uptrending with EKG showing no ST changes patient was diagnosed with a non-STEMI and went to Cath Lab where stents were placed    Patient will be discussed with [facility] physician assistant and admitted to Dr. Obando the hospitalist    Patient was discussed with the hospitalist and will be admitted       Amount and/or Complexity of Data Reviewed  Clinical lab tests: reviewed    Patient Progress  Patient progress: stable      Final diagnoses:   Right groin pain            Vivi Lin, APRN  12/21/20 212

## 2020-12-22 NOTE — PROGRESS NOTES
Discharge Planning Assessment  [facility]     Patient Name: Kimberly West  MRN: [id number]  Today's Date: 12/22/2020    Admit Date: 12/21/2020    Discharge Needs Assessment     Row Name 12/22/20 1208       Living Environment    Lives With  friend(s)    Current Living Arrangements  home/apartment/condo    Primary Care Provided by  self    Provides Primary Care For  no one    Able to Return to Prior Arrangements  yes       Resource/Environmental Concerns    Resource/Environmental Concerns  none    Transportation Concerns  car, none       Transition Planning    Patient/Family Anticipates Transition to  home    Patient/Family Anticipated Services at Transition      Transportation Anticipated  family or friend will provide       Discharge Needs Assessment    Readmission Within the Last 30 Days  no previous admission in last 30 days    Equipment Currently Used at Home  none    Concerns to be Addressed  denies needs/concerns at this time;no discharge needs identified    Anticipated Changes Related to Illness  none    Equipment Needed After Discharge  none        Discharge Plan     Row Name 12/22/20 1200       Plan    Plan  Anticipate routine home    Patient/Family in Agreement with Plan  yes    Plan Comments  Met with patient at bedside, reports she lives at home with a friend. I with ADLs, still drives, no DME. Patient stated she does not have a PCP. Informed through her [employer] insurance her assigned provider is KENIA Carbajal, she is agreeable to have new patient appt arranged prior to discharge. Attempted to call PCP office and they are closed for lunch, will follow up to scheduled appt. Denies issues with affording food or medications. DC barriers: CT abd/pelvis    Update 1400: Left voicemail at Dr. Carbajal's office ph# [phone], awaiting return phone call to make new patient PCP appointment.        Demographic Summary     Row Name 12/22/20 1206       General Information    Admission Type   observation    Arrived From  emergency department    Referral Source  admission list    Reason for Consult  discharge planning    Preferred Language  English        Functional Status     Row Name 12/22/20 1208       Functional Status    Usual Activity Tolerance  good    Current Activity Tolerance  good       Functional Status, IADL    Medications  independent    Meal Preparation  independent    Housekeeping  independent    Laundry  independent    Shopping  independent        Met with patient in room wearing PPE: mask, goggles.     Maintained distance greater than six feet and spent less than 15 minutes in the room.            Pamela Wheeler RN

## 2020-12-22 NOTE — PLAN OF CARE
Goal Outcome Evaluation:  Plan of Care Reviewed With: patient     Outcome Summary: Patient came to ER with complaints of pain to Right groin area were she had a heart cath last Thursday. Orders to have Duplex Groin-Pseudoaneurysm unilateral due to pain/swelling to recent cath site.

## 2020-12-22 NOTE — CONSULTS
"Name: Kimberly West ADMIT: 2020   : 1965  PCP: Provider, No Known    MRN: [id number] LOS: 0 days   AGE/SEX: 55 y.o. female  ROOM: 250/1   [facility]      Patient Care Team:  Provider, No Known as PCP - General  Chief Complaint   Patient presents with   • Leg Pain     CC:right groin pain and bruising     Subjective     Consults  History of Present Illness   Kimberly Caal is a 55-year-old female who presented to [facility] with bruising and right groin pain after undergoing heart catheterization with stent placement on 2020 at [facility].  She reports the pain is intermittent however started shortly after the heart catheterization.  She also reports \"there is a knot\".     Past medical history includes CAD with coronary stent placement, hypertension, obesity and tobacco abuse.  Home medicines include dual antiplatelet and statin therapy.      Review of Systems   Constitutional: Positive for fatigue.   HENT: Negative.    Eyes: Negative.    Respiratory: Negative for shortness of breath.    Cardiovascular: Negative for chest pain.   Gastrointestinal: Negative for abdominal pain.   Musculoskeletal: Negative for back pain.   Skin: Positive for color change. Negative for wound.   Psychiatric/Behavioral: Negative.    All other systems reviewed and are negative.      History reviewed. No pertinent past medical history.  History reviewed. No pertinent surgical history.  No family history on file.  Social History     Tobacco Use   • Smoking status: Current Every Day Smoker     Packs/day: 0.50     Years: 25.00     Pack years: 12.50     Types: Cigarettes   • Smokeless tobacco: Never Used   Substance Use Topics   • Alcohol use: Never     Frequency: Never     Binge frequency: Never   • Drug use: Yes     Types: Marijuana     Medications Prior to Admission   Medication Sig Dispense Refill Last Dose   • aspirin 81 MG chewable tablet Chew 81 mg Daily.   2020 at Unknown time "   • clopidogrel (PLAVIX) 75 MG tablet Take 75 mg by mouth Daily.   12/21/2020 at Unknown time   • lisinopril (PRINIVIL,ZESTRIL) 5 MG tablet Take 5 mg by mouth Daily.   12/21/2020 at Unknown time   • Metoprolol Succinate 25 MG capsule extended-release 24 hour sprinkle Take 25 mg by mouth Daily.   12/21/2020 at Unknown time   • atorvastatin (LIPITOR) 80 MG tablet Take 80 mg by mouth Every Night.        aspirin, 81 mg, Oral, Daily  atorvastatin, 80 mg, Oral, Nightly  clopidogrel, 75 mg, Oral, Daily  lisinopril, 5 mg, Oral, Daily  metoprolol succinate XL, 25 mg, Oral, Q24H  nicotine, 1 patch, Transdermal, Q24H  sodium chloride, 10 mL, Intravenous, Q12H         •  acetaminophen **OR** acetaminophen **OR** acetaminophen  •  Calcium Gluconate-NaCl **AND** calcium gluconate **AND** Calcium, Ionized  •  magnesium sulfate **OR** magnesium sulfate **OR** magnesium sulfate  •  melatonin  •  nitroglycerin  •  ondansetron **OR** ondansetron  •  potassium & sodium phosphates **OR** potassium & sodium phosphates  •  potassium chloride  •  potassium chloride  •  [COMPLETED] Insert peripheral IV **AND** sodium chloride  •  sodium chloride  Patient has no known allergies.    Objective     Physical Exam:  Physical Exam  Constitutional:       Appearance: She is well-developed.   HENT:      Head: Normocephalic and atraumatic.   Eyes:      Pupils: Pupils are equal, round, and reactive to light.   Neck:      Musculoskeletal: Normal range of motion.      Trachea: No tracheal deviation.   Cardiovascular:      Rate and Rhythm: Normal rate and regular rhythm.      Comments: Patient has palpable DP and PT bilaterally.  Right groin is soft but tender upon palpation.  Right groin and medial thigh bruising noted.  Pulmonary:      Effort: Pulmonary effort is normal. No respiratory distress.   Abdominal:      Palpations: Abdomen is soft. There is no mass.      Tenderness: There is no abdominal tenderness. There is no guarding.   Skin:     General:  "Skin is warm and dry.   Neurological:      Mental Status: She is alert.          Vital Signs and Labs:  Vital Signs Patient Vitals for the past 24 hrs:   BP Temp Temp src Pulse Resp SpO2 Height Weight   12/22/20 0355 90/60 97.7 °F (36.5 °C) Oral 72 16 96 % -- --   12/21/20 2217 115/74 97.9 °F (36.6 °C) Oral 89 16 99 % -- --   12/21/20 2132 102/59 -- -- 81 -- 99 % -- --   12/21/20 2102 90/62 -- -- 97 -- 99 % -- --   12/21/20 2047 95/62 -- -- 94 -- 99 % -- --   12/21/20 2037 (!) 135/113 -- -- 105 -- 99 % -- --   12/21/20 1953 116/73 -- -- -- -- 99 % -- --   12/21/20 1944 114/74 98.5 °F (36.9 °C) Oral 105 18 97 % 149.9 cm (59\") 67.9 kg (149 lb 11.1 oz)     I/O:  No intake/output data recorded.  BMI:  Body mass index is 30.23 kg/m².    CBC    Results from last 7 days   Lab Units 12/22/20 0257 12/21/20 2019   WBC 10*3/mm3 10.50 11.50*   HEMOGLOBIN g/dL 10.8* 11.6*   PLATELETS 10*3/mm3 274 301     BMP   Results from last 7 days   Lab Units 12/22/20 0257 12/21/20 2239 12/21/20 2019   SODIUM mmol/L 142  --  141   POTASSIUM mmol/L 4.4 3.6 4.2   CHLORIDE mmol/L 107  --  106   CO2 mmol/L 30.0*  --  29.0   BUN mg/dL 19  --  16   CREATININE mg/dL 1.00  --  0.82   GLUCOSE mg/dL 97  --  89   MAGNESIUM mg/dL 2.1  --   --      Coag   Results from last 7 days   Lab Units 12/21/20 2019   INR  <0.93*     HbA1C No results found for: HGBA1C  Infection     Radiology(recent) No radiology results for the last day    Active Hospital Problems    Diagnosis  POA   • **Right groin pain [R10.31]  Yes   • Essential hypertension [I10]  Yes   • Tobacco abuse [Z72.0]  Yes   • CAD (coronary artery disease) [I25.10]  Yes   • Obesity (BMI 30-39.9) [E66.9]  Yes      Resolved Hospital Problems   No resolved problems to display.       [id number]    Assessment/Plan       Right groin pain    CAD (coronary artery disease)    Obesity (BMI 30-39.9)    Essential hypertension    Tobacco abuse      55 y.o. female who we were asked to evaluate for right groin pain " status post heart catheterization on 12/17/2020.    Duplex performed on 12/22/2020 demonstrates no evidence of pseudoaneurysm or AV fistula in the right groin.    Discussed with patient this a.m. that bruising and small hematoma would resolve with time.  However, if right groin symptoms worsen, would recommend further evaluation.  She expressed understanding.    At this time, no vascular intervention is recommended.  We will see again upon request.    Personal protective equipment used for this patient encounter:  Patient wearing surgical mask []    Provider wearing a surgical mask [x]    Gloves [x]    Eye protection []    Face Shield []    Gown []    N 95 respirator or CAPR/PAPR []   Duration of interaction 15 minutes     I discussed the patients findings and my recommendations with patient.    Roxanne Oliveira PA-C  12/22/20  07:54 EST    Please call my office with any question: [phone]

## 2020-12-23 VITALS
TEMPERATURE: 97.9 F | OXYGEN SATURATION: 96 % | RESPIRATION RATE: 18 BRPM | WEIGHT: 156.09 LBS | HEIGHT: 59 IN | BODY MASS INDEX: 31.47 KG/M2 | DIASTOLIC BLOOD PRESSURE: 66 MMHG | HEART RATE: 70 BPM | SYSTOLIC BLOOD PRESSURE: 109 MMHG

## 2020-12-23 PROBLEM — R10.31 RIGHT GROIN PAIN: Status: RESOLVED | Noted: 2020-12-21 | Resolved: 2020-12-23

## 2020-12-23 PROBLEM — K40.90 RIGHT INGUINAL HERNIA: Status: ACTIVE | Noted: 2020-12-23

## 2020-12-23 LAB
ANION GAP SERPL CALCULATED.3IONS-SCNC: 7 MMOL/L (ref 5–15)
BASOPHILS # BLD AUTO: 0.1 10*3/MM3 (ref 0–0.2)
BASOPHILS NFR BLD AUTO: 0.9 % (ref 0–1.5)
BUN SERPL-MCNC: 22 MG/DL (ref 6–20)
BUN/CREAT SERPL: 22.9 (ref 7–25)
CALCIUM SPEC-SCNC: 8.6 MG/DL (ref 8.6–10.5)
CHLORIDE SERPL-SCNC: 106 MMOL/L (ref 98–107)
CO2 SERPL-SCNC: 28 MMOL/L (ref 22–29)
CREAT SERPL-MCNC: 0.96 MG/DL (ref 0.57–1)
DEPRECATED RDW RBC AUTO: 46.4 FL (ref 37–54)
EOSINOPHIL # BLD AUTO: 0.3 10*3/MM3 (ref 0–0.4)
EOSINOPHIL NFR BLD AUTO: 3.1 % (ref 0.3–6.2)
ERYTHROCYTE [DISTWIDTH] IN BLOOD BY AUTOMATED COUNT: 14.6 % (ref 12.3–15.4)
GFR SERPL CREATININE-BSD FRML MDRD: 60 ML/MIN/1.73
GLUCOSE SERPL-MCNC: 95 MG/DL (ref 65–99)
HCT VFR BLD AUTO: 33.4 % (ref 34–46.6)
HGB BLD-MCNC: 11 G/DL (ref 12–15.9)
LYMPHOCYTES # BLD AUTO: 3 10*3/MM3 (ref 0.7–3.1)
LYMPHOCYTES NFR BLD AUTO: 30.4 % (ref 19.6–45.3)
MAGNESIUM SERPL-MCNC: 2.1 MG/DL (ref 1.6–2.6)
MCH RBC QN AUTO: 29.3 PG (ref 26.6–33)
MCHC RBC AUTO-ENTMCNC: 32.8 G/DL (ref 31.5–35.7)
MCV RBC AUTO: 89.3 FL (ref 79–97)
MONOCYTES # BLD AUTO: 0.8 10*3/MM3 (ref 0.1–0.9)
MONOCYTES NFR BLD AUTO: 8.3 % (ref 5–12)
NEUTROPHILS NFR BLD AUTO: 5.6 10*3/MM3 (ref 1.7–7)
NEUTROPHILS NFR BLD AUTO: 57.3 % (ref 42.7–76)
NRBC BLD AUTO-RTO: 0.1 /100 WBC (ref 0–0.2)
PLATELET # BLD AUTO: 299 10*3/MM3 (ref 140–450)
PMV BLD AUTO: 7.6 FL (ref 6–12)
POTASSIUM SERPL-SCNC: 4.5 MMOL/L (ref 3.5–5.2)
RBC # BLD AUTO: 3.74 10*6/MM3 (ref 3.77–5.28)
SODIUM SERPL-SCNC: 141 MMOL/L (ref 136–145)
WBC # BLD AUTO: 9.8 10*3/MM3 (ref 3.4–10.8)

## 2020-12-23 PROCEDURE — 83735 ASSAY OF MAGNESIUM: CPT | Performed by: PHYSICIAN ASSISTANT

## 2020-12-23 PROCEDURE — 85025 COMPLETE CBC W/AUTO DIFF WBC: CPT | Performed by: PHYSICIAN ASSISTANT

## 2020-12-23 PROCEDURE — G0378 HOSPITAL OBSERVATION PER HR: HCPCS

## 2020-12-23 PROCEDURE — 99217 PR OBSERVATION CARE DISCHARGE MANAGEMENT: CPT | Performed by: HOSPITALIST

## 2020-12-23 PROCEDURE — 80048 BASIC METABOLIC PNL TOTAL CA: CPT | Performed by: PHYSICIAN ASSISTANT

## 2020-12-23 RX ADMIN — ASPIRIN 81 MG CHEWABLE TABLET 81 MG: 81 TABLET CHEWABLE at 09:24

## 2020-12-23 RX ADMIN — METOPROLOL SUCCINATE 25 MG: 25 TABLET, EXTENDED RELEASE ORAL at 09:24

## 2020-12-23 RX ADMIN — Medication 10 ML: at 09:24

## 2020-12-23 RX ADMIN — LISINOPRIL 5 MG: 5 TABLET ORAL at 09:24

## 2020-12-23 RX ADMIN — CLOPIDOGREL BISULFATE 75 MG: 75 TABLET ORAL at 09:24

## 2020-12-23 NOTE — PROGRESS NOTES
Continued Stay Note   [facility]     Patient Name: Kimberly West  MRN: [id number]  Today's Date: 12/23/2020    Admit Date: 12/21/2020    Discharge Plan     Row Name 12/23/20 0845       Plan    Plan  Anticipate routine home    Plan Comments  Called and left another voicemail at Dr. Carbajal's office ph# [phone] to set up a new patient appointment, awaiting return phone call. Updated AVS with Dr. Carbajal's information. Discharge orders noted.    Final Discharge Disposition Code  01 - home or self-care    Final Note  Home            Pamela Wheeler RN

## 2020-12-23 NOTE — DISCHARGE SUMMARY
[facility]  DISCHARGE SUMMARY        Prepared For PCP:  Provider, No Known    Patient Name: Kimberly West  : 1965  MRN: [id number]      Date of Admission:   2020    Date of Discharge:  2020    Length of stay:  LOS: 0 days     Hospital Course     Presenting Problem:   Right groin pain [R10.31]      Active Hospital Problems    Diagnosis  POA   • **Right inguinal hernia [K40.90]  Yes     Priority: High   • Right groin pain [R10.31]  Yes     Priority: High   • Essential hypertension [I10]  Yes     Priority: Medium   • Tobacco abuse [Z72.0]  Yes     Priority: Medium   • CAD (coronary artery disease) [I25.10]  Yes     Priority: Medium   • Obesity (BMI 30-39.9) [E66.9]  Yes     Priority: Medium      Resolved Hospital Problems   No resolved problems to display.           Hospital Course:    The patient was placed on observation.  Right femoral duplex ruled out pseudoaneurysm.  CTA of the abdomen and pelvis ruled out retroperitoneal bleed.  It showed right inguinal hematoma which can be treated with warm compress per vascular surgery recommendations.  The patient will be discharged home on 20.      Problem list during hospitalization:    Right groin pain:  -s/p LHC on 20 at [facility].  Nodule approximately 1/2 inch in diameter with slight pulsation noted  -right femoral duplex --> pseudoaneurysm ruled out  -s/p CTA abd/pelvis--> right inguinal hematoma.  Retroperitoneal bleed ruled out.  -Vascular surgery consulted  -Apply warm compress to right groin hematoma as needed        CAD:  -Denies chest pain  -Aspirin, statin, Plavix and beta-blocker     Hypertension  - Continue metoprolol and lisinopril       Tobacco abuse:  -Counseled on cessation      Recommendation for Outpatient Providers:       Follow-up with PCP in 2 weeks.      Reasons For Change In Medications and Indications for New Medications:        Day of Discharge      HPI:     The patient is a 55 y.o. female with past medical history of tobacco abuse, obesity, hypertension CAD s/p recent LHC/stenting on 12/17/20 at [facility] presented to the emergency room on 12/21/20 complaining of persistent right groin pain.  The patient claimed to have had expanding bruising on her anterior and middle thigh and a nodule that is persistent in the medial thigh.  She decided to come to the ED because of worsening pain with ambulation.     In the ED patient did have lab significant for WBCs: 11.50 with increased absolute monocyte count noted on differential, hemoglobin: 11.6, INR: 0.93.          Vital Signs:   Temp:  [97.6 °F (36.4 °C)-98.5 °F (36.9 °C)] 97.9 °F (36.6 °C)  Heart Rate:  [70-95] 70  Resp:  [16-19] 18  BP: ()/(55-74) 94/58     Physical Exam:  Physical Exam  HENT:      Head: Normocephalic.      Mouth/Throat:      Mouth: Mucous membranes are moist.   Eyes:      General: No scleral icterus.     Extraocular Movements: Extraocular movements intact.      Pupils: Pupils are equal, round, and reactive to light.   Neck:      Musculoskeletal: Normal range of motion.   Cardiovascular:      Rate and Rhythm: Normal rate and regular rhythm.   Pulmonary:      Effort: Pulmonary effort is normal.      Breath sounds: Normal breath sounds.   Abdominal:      General: Bowel sounds are normal.      Palpations: Abdomen is soft.   Musculoskeletal: Normal range of motion.   Skin:     General: Skin is warm.   Neurological:      General: No focal deficit present.      Mental Status: She is alert.   Psychiatric:         Mood and Affect: Mood normal.         Pertinent  and/or Most Recent Results     Results from last 7 days   Lab Units 12/23/20  0242 12/22/20  0257 12/21/20 2239 12/21/20 2019   WBC 10*3/mm3 9.80 10.50  --  11.50*   HEMOGLOBIN g/dL 11.0* 10.8*  --  11.6*   HEMATOCRIT % 33.4* 32.5*  --  35.1   PLATELETS 10*3/mm3 299 274  --  301   SODIUM mmol/L 141 142  --  141   POTASSIUM mmol/L 4.5 4.4  3.6 4.2   CHLORIDE mmol/L 106 107  --  106   CO2 mmol/L 28.0 30.0*  --  29.0   BUN mg/dL 22* 19  --  16   CREATININE mg/dL 0.96 1.00  --  0.82   GLUCOSE mg/dL 95 97  --  89   CALCIUM mg/dL 8.6 8.7  --  8.8     Results from last 7 days   Lab Units 12/21/20 2019   BILIRUBIN mg/dL 0.2   ALK PHOS U/L 100   ALT (SGPT) U/L 17   AST (SGOT) U/L 13   PROTIME Seconds 10.0   INR  <0.93*           Invalid input(s): TG, LDLCALC, LDLREALC        Brief Urine Lab Results     None          Microbiology Results Abnormal     None          Ct Abdomen Pelvis With Contrast    Result Date: 12/22/2020  Impression:  1. Small right inguinal hematoma located anterior to the right common femoral artery and vein. No pseudoaneurysm or active IV contrast extravasation. 2. Additional chronic findings as described above.    Electronically Signed By-Aimee Bush MD On:12/22/2020 1:23 PM This report was finalized on [id number] by  Aimee Bush MD.      Results for orders placed during the hospital encounter of 12/21/20   Duplex Groin Pseudoaneurysm unilateral CAR    Narrative · No evidence of pseudoaneurysm or AV fistula in the right groin.          Results for orders placed during the hospital encounter of 12/21/20   Duplex Groin Pseudoaneurysm unilateral CAR    Narrative · No evidence of pseudoaneurysm or AV fistula in the right groin.               Test Results Pending at Discharge    Procedures Performed           Consults:   Consults     Date and Time Order Name Status Description    12/22/2020 0606 Inpatient Vascular Surgery Consult Completed     12/21/2020 2118 Hospitalist (on-call MD unless specified) Completed             Discharge Details        Discharge Medications      Continue These Medications      Instructions Start Date   aspirin 81 MG chewable tablet   81 mg, Oral, Daily      atorvastatin 80 MG tablet  Commonly known as: LIPITOR   80 mg, Oral, Nightly      clopidogrel 75 MG tablet  Commonly known as: PLAVIX   75 mg, Oral,  Daily      lisinopril 5 MG tablet  Commonly known as: PRINIVIL,ZESTRIL   5 mg, Oral, Daily      Metoprolol Succinate 25 MG capsule extended-release 24 hour sprinkle   25 mg, Oral, Daily             No Known Allergies      Discharge Disposition:  Home or Self Care    Diet:  Hospital:  Diet Order   Procedures   • Diet Cardiac; Healthy Heart         Discharge Activity: as tolerated        CODE STATUS:    Code Status and Medical Interventions:   Ordered at: 12/21/20 2139     Code Status:    CPR     Medical Interventions (Level of Support Prior to Arrest):    Full         Follow-up Appointments  No future appointments.    Additional Instructions for the Follow-ups that You Need to Schedule     Discharge Follow-up with PCP   As directed       Currently Documented PCP:    Provider, No Known    PCP Phone Number:    None     Follow Up Details: 2 weeks                 Condition on Discharge:      Stable      This patient has been examined wearing appropriate Personal Protective Equipment and discussed with nursing. 12/23/20      Electronically signed by Manolo Melgoza DO, 12/23/20, 8:09 AM EST.      Time: I spent  15 minutes on this discharge activity which included face-to-face encounter with the patient/reviewing the data in the system/coordination of the care with the nursing staff as well as consultants/documentation/entering orders.

## 2020-12-23 NOTE — PLAN OF CARE
Goal Outcome Evaluation:  Plan of Care Reviewed With: patient  Progress: improving  Outcome Summary: pt had no complaints throughout shift, resting comfortably throught night, will continue to monitor.

## 2021-05-27 ENCOUNTER — APPOINTMENT (OUTPATIENT)
Dept: GENERAL RADIOLOGY | Facility: HOSPITAL | Age: 56
End: 2021-05-27

## 2021-05-27 ENCOUNTER — HOSPITAL ENCOUNTER (EMERGENCY)
Facility: HOSPITAL | Age: 56
Discharge: HOME | End: 2021-05-27
Attending: EMERGENCY MEDICINE | Admitting: EMERGENCY MEDICINE

## 2021-05-27 VITALS
TEMPERATURE: 97.9 F | WEIGHT: 140 LBS | RESPIRATION RATE: 18 BRPM | SYSTOLIC BLOOD PRESSURE: 148 MMHG | OXYGEN SATURATION: 98 % | HEIGHT: 62 IN | BODY MASS INDEX: 25.76 KG/M2 | DIASTOLIC BLOOD PRESSURE: 79 MMHG | HEART RATE: 75 BPM

## 2021-05-27 DIAGNOSIS — S52.501A CLOSED FRACTURE OF DISTAL ENDS OF RIGHT RADIUS AND ULNA, INITIAL ENCOUNTER: Primary | ICD-10-CM

## 2021-05-27 DIAGNOSIS — S52.601A CLOSED FRACTURE OF DISTAL ENDS OF RIGHT RADIUS AND ULNA, INITIAL ENCOUNTER: Primary | ICD-10-CM

## 2021-05-27 PROCEDURE — 73100 X-RAY EXAM OF WRIST: CPT

## 2021-05-27 PROCEDURE — 73110 X-RAY EXAM OF WRIST: CPT

## 2021-05-27 PROCEDURE — 99283 EMERGENCY DEPT VISIT LOW MDM: CPT

## 2021-05-27 PROCEDURE — 73090 X-RAY EXAM OF FOREARM: CPT

## 2021-05-27 PROCEDURE — 73070 X-RAY EXAM OF ELBOW: CPT

## 2021-05-27 RX ORDER — HYDROCODONE BITARTRATE AND ACETAMINOPHEN 5; 325 MG/1; MG/1
1 TABLET ORAL EVERY 6 HOURS PRN
Qty: 20 TABLET | Refills: 0 | Status: SHIPPED | OUTPATIENT
Start: 2021-05-27

## 2021-05-27 RX ORDER — HYDROCODONE BITARTRATE AND ACETAMINOPHEN 7.5; 325 MG/1; MG/1
1 TABLET ORAL ONCE
Status: COMPLETED | OUTPATIENT
Start: 2021-05-27 | End: 2021-05-27

## 2021-05-27 RX ADMIN — HYDROCODONE BITARTRATE AND ACETAMINOPHEN 1 TABLET: 7.5; 325 TABLET ORAL at 16:24

## 2021-06-08 ENCOUNTER — HOSPITAL ENCOUNTER (OUTPATIENT)
Facility: HOSPITAL | Age: 56
Setting detail: HOSPITAL OUTPATIENT SURGERY
End: 2021-06-08
Attending: STUDENT IN AN ORGANIZED HEALTH CARE EDUCATION/TRAINING PROGRAM | Admitting: STUDENT IN AN ORGANIZED HEALTH CARE EDUCATION/TRAINING PROGRAM

## 2021-06-08 VITALS — BODY MASS INDEX: 26.68 KG/M2 | WEIGHT: 145 LBS | HEIGHT: 62 IN

## 2021-06-10 ENCOUNTER — HOSPITAL ENCOUNTER (OUTPATIENT)
Dept: CARDIOLOGY | Facility: HOSPITAL | Age: 56
Discharge: HOME | End: 2021-06-10

## 2021-06-10 ENCOUNTER — LAB (OUTPATIENT)
Dept: LAB | Facility: HOSPITAL | Age: 56
End: 2021-06-10

## 2021-06-10 ENCOUNTER — HOSPITAL ENCOUNTER (OUTPATIENT)
Dept: GENERAL RADIOLOGY | Facility: HOSPITAL | Age: 56
Discharge: HOME | End: 2021-06-10

## 2021-06-10 LAB
ANION GAP SERPL CALCULATED.3IONS-SCNC: 12.3 MMOL/L (ref 5–15)
BASOPHILS # BLD AUTO: 0.09 10*3/MM3 (ref 0–0.2)
BASOPHILS NFR BLD AUTO: 0.9 % (ref 0–1.5)
BUN SERPL-MCNC: 17 MG/DL (ref 6–20)
BUN/CREAT SERPL: 19.1 (ref 7–25)
CALCIUM SPEC-SCNC: 9.2 MG/DL (ref 8.6–10.5)
CHLORIDE SERPL-SCNC: 103 MMOL/L (ref 98–107)
CO2 SERPL-SCNC: 25.7 MMOL/L (ref 22–29)
CREAT SERPL-MCNC: 0.89 MG/DL (ref 0.57–1)
DEPRECATED RDW RBC AUTO: 44.2 FL (ref 37–54)
EOSINOPHIL # BLD AUTO: 0.24 10*3/MM3 (ref 0–0.4)
EOSINOPHIL NFR BLD AUTO: 2.4 % (ref 0.3–6.2)
ERYTHROCYTE [DISTWIDTH] IN BLOOD BY AUTOMATED COUNT: 14.2 % (ref 12.3–15.4)
GFR SERPL CREATININE-BSD FRML MDRD: 66 ML/MIN/1.73
GLUCOSE SERPL-MCNC: 86 MG/DL (ref 65–99)
HCT VFR BLD AUTO: 44.3 % (ref 34–46.6)
HGB BLD-MCNC: 15 G/DL (ref 12–15.9)
IMM GRANULOCYTES # BLD AUTO: 0.03 10*3/MM3 (ref 0–0.05)
IMM GRANULOCYTES NFR BLD AUTO: 0.3 % (ref 0–0.5)
LYMPHOCYTES # BLD AUTO: 2.64 10*3/MM3 (ref 0.7–3.1)
LYMPHOCYTES NFR BLD AUTO: 26.9 % (ref 19.6–45.3)
MCH RBC QN AUTO: 29.2 PG (ref 26.6–33)
MCHC RBC AUTO-ENTMCNC: 33.9 G/DL (ref 31.5–35.7)
MCV RBC AUTO: 86.4 FL (ref 79–97)
MONOCYTES # BLD AUTO: 0.69 10*3/MM3 (ref 0.1–0.9)
MONOCYTES NFR BLD AUTO: 7 % (ref 5–12)
MRSA DNA SPEC QL NAA+PROBE: NORMAL
NEUTROPHILS NFR BLD AUTO: 6.14 10*3/MM3 (ref 1.7–7)
NEUTROPHILS NFR BLD AUTO: 62.5 % (ref 42.7–76)
NRBC BLD AUTO-RTO: 0 /100 WBC (ref 0–0.2)
PLATELET # BLD AUTO: 382 10*3/MM3 (ref 140–450)
PMV BLD AUTO: 9.8 FL (ref 6–12)
POTASSIUM SERPL-SCNC: 3.8 MMOL/L (ref 3.5–5.2)
RBC # BLD AUTO: 5.13 10*6/MM3 (ref 3.77–5.28)
SARS-COV-2 ORF1AB RESP QL NAA+PROBE: NOT DETECTED
SODIUM SERPL-SCNC: 141 MMOL/L (ref 136–145)
WBC # BLD AUTO: 9.83 10*3/MM3 (ref 3.4–10.8)

## 2021-06-10 PROCEDURE — C9803 HOPD COVID-19 SPEC COLLECT: HCPCS

## 2021-06-10 PROCEDURE — 93010 ELECTROCARDIOGRAM REPORT: CPT | Performed by: INTERNAL MEDICINE

## 2021-06-10 PROCEDURE — 85025 COMPLETE CBC W/AUTO DIFF WBC: CPT | Performed by: STUDENT IN AN ORGANIZED HEALTH CARE EDUCATION/TRAINING PROGRAM

## 2021-06-10 PROCEDURE — 71046 X-RAY EXAM CHEST 2 VIEWS: CPT

## 2021-06-10 PROCEDURE — 36415 COLL VENOUS BLD VENIPUNCTURE: CPT | Performed by: STUDENT IN AN ORGANIZED HEALTH CARE EDUCATION/TRAINING PROGRAM

## 2021-06-10 PROCEDURE — U0004 COV-19 TEST NON-CDC HGH THRU: HCPCS

## 2021-06-10 PROCEDURE — 87641 MR-STAPH DNA AMP PROBE: CPT

## 2021-06-10 PROCEDURE — 80048 BASIC METABOLIC PNL TOTAL CA: CPT | Performed by: STUDENT IN AN ORGANIZED HEALTH CARE EDUCATION/TRAINING PROGRAM

## 2021-06-10 PROCEDURE — 93005 ELECTROCARDIOGRAM TRACING: CPT

## 2021-06-11 LAB — QT INTERVAL: 373 MS
